# Patient Record
Sex: FEMALE | Race: BLACK OR AFRICAN AMERICAN | URBAN - METROPOLITAN AREA
[De-identification: names, ages, dates, MRNs, and addresses within clinical notes are randomized per-mention and may not be internally consistent; named-entity substitution may affect disease eponyms.]

---

## 2022-10-24 ENCOUNTER — OFFICE VISIT (OUTPATIENT)
Dept: OBGYN CLINIC | Age: 27
End: 2022-10-24
Payer: MEDICAID

## 2022-10-24 VITALS — DIASTOLIC BLOOD PRESSURE: 63 MMHG | SYSTOLIC BLOOD PRESSURE: 114 MMHG | WEIGHT: 165 LBS

## 2022-10-24 DIAGNOSIS — N89.8 VAGINAL ODOR: ICD-10-CM

## 2022-10-24 DIAGNOSIS — Z01.419 ROUTINE GYNECOLOGICAL EXAMINATION: Primary | ICD-10-CM

## 2022-10-24 PROCEDURE — 99385 PREV VISIT NEW AGE 18-39: CPT | Performed by: OBSTETRICS & GYNECOLOGY

## 2022-10-24 NOTE — PROGRESS NOTES
HISTORY OF PRESENT ILLNESS  Flor Murguia is a 32 y.o. female who presents today for the following:  Chief Complaint   Patient presents with    Annual Exam   Patient is a 59-year-old G1, P3 female who presents today for routine annual exam.  She complains of vaginal discharge with odor returns. He is otherwise without complaints.     No Known Allergies    No current outpatient medications on file. No current facility-administered medications for this visit. Past Medical History:   Diagnosis Date    Anemia        History reviewed. No pertinent surgical history. History reviewed. No pertinent family history. Social History     Socioeconomic History    Marital status: SINGLE     Spouse name: Not on file    Number of children: Not on file    Years of education: Not on file    Highest education level: Not on file   Occupational History    Not on file   Tobacco Use    Smoking status: Never    Smokeless tobacco: Never   Substance and Sexual Activity    Alcohol use: Yes    Drug use: Never    Sexual activity: Not Currently     Partners: Male     Birth control/protection: None   Other Topics Concern    Not on file   Social History Narrative    Not on file     Social Determinants of Health     Financial Resource Strain: Not on file   Food Insecurity: Not on file   Transportation Needs: Not on file   Physical Activity: Not on file   Stress: Not on file   Social Connections: Not on file   Intimate Partner Violence: Not on file   Housing Stability: Not on file           REVIEW OF SYSTEMS     Constitutional: Negative for chills, fever and malaise/fatigue. HENT: Negative for congestion, hearing loss and sore throat. Respiratory: Negative for cough, sputum production, shortness of breath and wheezing. Cardiovascular: Negative for chest pain. Gastrointestinal: Negative for abdominal pain, constipation, diarrhea, nausea and vomiting.    Genitourinary: Negative for dysuria, flank pain, hematuria and urgency. Neurological: Negative for dizziness, loss of consciousness, weakness and headaches. Psychiatric/Behavioral: Negative for depression. PHYSICAL EXAM  /63 (BP 1 Location: Left upper arm, BP Patient Position: Sitting, BP Cuff Size: Small adult)   Wt 165 lb (74.8 kg)   LMP 10/17/2022 (Approximate)      Patient is a well-developed well-nourished female no apparent distress  She is alert and oriented x3  Head is normocephalic atraumatic pupils equal round react light accommodation  Neck is supple without adenopathy or thyromegaly  Heart is with regular rate and rhythm without murmurs rubs or gallops  Lungs are clear to auscultation and percussion bilaterally  Breasts are without masses bilaterally  Abdomen is soft nontender nondistended bowel sounds are present and active  Extremities are without clubbing cyanosis or edema  Pulses are full and symmetric bilaterally  Pelvic  External genitalia within normal limits  Urethra is midline there are no apparent urethral lesions the bladder is within normal limits  Vagina is with normal rugae there is minimal discharge present in the vaginal vault  Cervix is parous, there are no apparent cervical lesions, there is no cervical motion tenderness  Uterus is normal size and contours  Adnexa are without masses    ASSESSMENT and PLAN  Normal annual exam  Plan: Pap performed, NU swab sent follow-up as needed and yearly  No results found for this visit on 10/24/22. No orders of the defined types were placed in this encounter.

## 2022-10-26 LAB
A VAGINAE DNA VAG QL NAA+PROBE: ABNORMAL SCORE
BVAB2 DNA VAG QL NAA+PROBE: ABNORMAL SCORE
C ALBICANS DNA VAG QL NAA+PROBE: NEGATIVE
C GLABRATA DNA VAG QL NAA+PROBE: NEGATIVE
C KRUSEI DNA VAG QL NAA+PROBE: NEGATIVE
C LUSITANIAE DNA VAG QL NAA+PROBE: NEGATIVE
C TRACH DNA VAG QL NAA+PROBE: NEGATIVE
CANDIDA DNA VAG QL NAA+PROBE: NEGATIVE
MEGA1 DNA VAG QL NAA+PROBE: ABNORMAL SCORE
N GONORRHOEA DNA VAG QL NAA+PROBE: NEGATIVE
T VAGINALIS DNA VAG QL NAA+PROBE: NEGATIVE

## 2022-10-26 RX ORDER — METRONIDAZOLE 500 MG/1
500 TABLET ORAL 2 TIMES DAILY
Qty: 14 TABLET | Refills: 0 | Status: SHIPPED | OUTPATIENT
Start: 2022-10-26 | End: 2022-11-02

## 2022-10-31 LAB
CYTOLOGIST CVX/VAG CYTO: NORMAL
CYTOLOGY CVX/VAG DOC CYTO: NORMAL
CYTOLOGY CVX/VAG DOC THIN PREP: NORMAL
DX ICD CODE: NORMAL
LABCORP, 190119: NORMAL
Lab: NORMAL
Lab: NORMAL
OTHER STN SPEC: NORMAL
STAT OF ADQ CVX/VAG CYTO-IMP: NORMAL

## 2022-11-14 ENCOUNTER — TELEPHONE (OUTPATIENT)
Dept: OBGYN CLINIC | Age: 27
End: 2022-11-14

## 2022-11-14 DIAGNOSIS — N76.0 BACTERIAL VAGINOSIS: Primary | ICD-10-CM

## 2022-11-14 DIAGNOSIS — B96.89 BACTERIAL VAGINOSIS: Primary | ICD-10-CM

## 2022-11-14 RX ORDER — METRONIDAZOLE 500 MG/1
500 TABLET ORAL 2 TIMES DAILY
Qty: 14 TABLET | Refills: 0 | Status: SHIPPED | OUTPATIENT
Start: 2022-11-14 | End: 2022-11-21

## 2022-11-14 NOTE — TELEPHONE ENCOUNTER
Patient called stated she lost her purse that had her prescription in it which was the metronidazole for BV. Wants to know if she could get a refill on medication? Patient seen Dr. Shaw Burris.

## 2022-11-15 RX ORDER — METRONIDAZOLE 500 MG/1
TABLET ORAL
Qty: 14 TABLET | Refills: 0 | OUTPATIENT
Start: 2022-11-15